# Patient Record
(demographics unavailable — no encounter records)

---

## 2024-12-16 NOTE — CONSULT LETTER
[Dear  ___] : Dear  [unfilled], [Consult Letter:] : I had the pleasure of evaluating your patient, [unfilled]. [Please see my note below.] : Please see my note below. [Consult Closing:] : Thank you very much for allowing me to participate in the care of this patient.  If you have any questions, please do not hesitate to contact me. [Sincerely,] : Sincerely, [FreeTextEntry2] : Alexis Montemayor MD   [FreeTextEntry3] : Funmi Woodruff MD  Pediatric Otolaryngology/ Head & Neck Surgery [facility]   [address] [place] [address] Tel [phone] Fax [phone]

## 2024-12-16 NOTE — REASON FOR VISIT
[Subsequent Evaluation] : a subsequent evaluation for [Mother] : mother [Hearing Loss] : hearing loss [Sleep Apnea/ Snoring] : sleep apnea/ snoring [FreeTextEntry2] : ETD, microtia of right ear

## 2024-12-16 NOTE — HISTORY OF PRESENT ILLNESS
[de-identified] : 2yoM with a history of failed hearing screen on left side and passed in History of snoring and mouth breathing no gaping and right ear microtia., fatigue Recent cough and nasal congestion sometimes uses hearing aid History of 6 ear infections in the past 12 months. All ear infections were treated with an antibiotic  History of eye discharge with the ear infections  He is babbling and has around 5 words in his vocabulary. Receives speech services but inconsistently due to accommodations due to staffing issues. Denies bleeding, otorrhea. Fever last month but resolved with Tylenol  Using BAHA, in EI occasionally snoringbut with no pauses or choking reported , with nasal congestion. No longer using saline History of kidney ultrasound per family which was normal.

## 2024-12-16 NOTE — ASSESSMENT
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  I will DEFER RENAL ultrasound imaging workup to PCP given some association with microtia however, undetermined significance.   We discussed the work up of hearing loss (imaging, genetics, possible opthalmology followup).  We discussed Early Intervention candidacy and importance of front row seating in school.  We discussed hearing restoration: A soft band retained bone conduction sound processor for now, around age 6 we can consider CT imaging to evaluate for atresia repair candidacy vs. continuing a soft band retained bone conduction sound processor  vs. an osseointegrated bone conduction sound processor vs observation.   We discussed future ear reconstruction options around age 5-7: Prosthetic ear, medpore vs autogenous rib cartilage reconstruction vs observation. The family will consider. This child presents with a history of adenotonsillar hypertrophy and sleep disordered breathing.   History of otitis media. I discussed the option of ear tube placement. I have explained the risks of myringotomy and tube including but not limited to general anesthesia, bleeding, infection, persistent symptoms, retained ear tube, otorrhea, tympanic membrane perforation, and possible need for further procedures.  I have also discussed with the family:      1.  A sleep study/overnight polysomnogram evaluation, along with a continued trial of intranasal steroids. I discussed the risks of nasal steroids (ex: Fluticasone, off label non FDA approved use) and proper application of steroids laterally in the nostrils (saline sprays may also be added in epistaxis is an issue) and the importance of consistency (but that prolonged use may cause growth issues).       2.  Given the patient's corresponding history and physical examination findings, I think that it is reasonable to proceed with a tonsillectomy and adenoidectomy.I have explained the risks of tonsillectomy and adenoidectomy including but not limited to general anesthesia, bleeding, infection, failure to extubate, injury to the teeth/lips/gums/local structures, tonsil and/or adenoid regrowth, persistence of symptoms, velopharyngeal insufficiency, nasopharyngeal stenosis, swallowing dysfunction, post-operative hemorrhage, voice changes, several days prolonged hospitalization, possible need for prolonged mechanical ventilatory support, and possible need for further procedures. I have discussed with the family and offered two options to proceed.   I discussed the option of ear tube placement. I also discussed the option of expectant management (watchful waiting and and prn antibiotics I have explained the risks of myringotomy and tube including but not limited to general anesthesia, bleeding, infection, persistent symptoms, retained ear tube, otorrhea, tympanic membrane perforation, and possible need for further procedures.  The family opts for an adenotonsillectomy AND R EAR TUBE if snoring concerns and struggling at fu and HL on audio. The child will get postoperative acetaminophen alternating with ibuprofen, soft food and no strenuous activity/gym for 2 weeks, and one week away from school.  The patient will get floxin otic (5 drops bid for 3 days then as needed for otorrhea/infection) and will need a repeat audio in 3 months.   consider allergy testing age 5 (bing with ant mucus at a young age)   At this point the family opts for trying to use ha more often, fu as planned.

## 2024-12-16 NOTE — PHYSICAL EXAM
[TextEntry] : PHYSICAL EXAM:  CONSTITUTIONAL: well nourished, well developed, NON-DYSMORPHIC, and in no acute distress.    EYES: pupils equal and round and no abnormalities of the conjunctivae and lids.   RESPIRATORY: respirations unlabored, no increased work of breathing with use of accessory muscles and retractions. NO STRIDOR.  CARDIAC: warm extremities, no cyanosis. ABDOMEN: nondistended. THORAX: no gross deformities, no pectus defects.  NEUROLOGIC: cranial nerves II - VII and IX - XII with no gross deficits.  MUSCULOSKELETAL: normal muscle STRENGTH, symmetry and tone of facial, head and neck musculature, no clubbing.  INTEGUMENTARY: no obvious skin rash, no skin lesions. LYMPHATIC: no cervical lymphadenopathy.  PSYCHIATRIC: age APPROPRIATE behavior.  HEAD: normocephalic, atraumatic.  RIGHT EAR: The right pinna was normal. 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  NOSE: External Nose: normal  Anterior Nasal Cavity: the right nasal cavity was normal and the left nasal cavity was normal.  ORAL CAVITY/OROPHARYNX: normal mucosa  Right TONSIL Size:4+ Left TONSIL Size: 4+  NECK: normal with no obvious cervical lesions

## 2024-12-16 NOTE — DATA REVIEWED
[FreeTextEntry1] : An audiogram was performed today to evaluate eustachian tube status and hearing status and the results were reviewed and reveal: Tymps: AD type C tympanogram, AS type CNT tympanogram Soundfield/Thresholds: mild HL

## 2025-04-16 NOTE — ASSESSMENT
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  I will DEFER RENAL ultrasound imaging workup to PCP given some association with microtia however, undetermined significance.   We discussed the work up of hearing loss (imaging, genetics, possible opthalmology followup).  We discussed Early Intervention candidacy and importance of front row seating in school.  We discussed hearing restoration: A soft band retained bone conduction sound processor for now, around age 6 we can consider CT imaging to evaluate for atresia repair candidacy vs. continuing a soft band retained bone conduction sound processor  vs. an osseointegrated bone conduction sound processor vs observation.   We discussed future ear reconstruction options around age 5-7: Prosthetic ear, medpore vs autogenous rib cartilage reconstruction vs observation. The family will consider.  At this point the family opts for This child presents with a history of adenotonsillar hypertrophy and sleep disordered breathing.  I have also discussed with the family:      1.  A sleep study/overnight polysomnogram evaluation, along with a continued trial of intranasal steroids. I discussed the risks of nasal steroids (ex: Fluticasone, off label non FDA approved use) and proper application of steroids laterally in the nostrils (saline sprays may also be added in epistaxis is an issue) and the importance of consistency (but that prolonged use may cause growth issues).       2.  Given the patient's corresponding history and physical examination findings, I think that it is reasonable to proceed with a tonsillectomy and adenoidectomy.I have explained the risks of tonsillectomy and adenoidectomy including but not limited to general anesthesia, bleeding, infection, failure to extubate, injury to the teeth/lips/gums/local structures, tonsil and/or adenoid regrowth, persistence of symptoms, velopharyngeal insufficiency, nasopharyngeal stenosis, swallowing dysfunction, post-operative hemorrhage, voice changes, and possible need for further procedures. I have discussed with the family and offered two options to proceed.  The family opts for fu after PSG.

## 2025-04-16 NOTE — HISTORY OF PRESENT ILLNESS
[de-identified] : 2yoM with a history of failed hearing screen on left side and passed in History of snoring and mouth breathing no gaping and right ear microtia. History of 1 ear infection - January 2025 tx with Augmentin. No ear pain or drainage. Wears BAHA left ear 2-3 hours a day. Saying about 5 words - receives speech thru EI but still inconsistently. Continues to snore - used saline spray x 2 weeks then stopped. Mouth breathing, occasional pausing noticed, but no gasping or witnessed apnea. No recent fevers, throat infections or hospitalizations.

## 2025-04-16 NOTE — DATA REVIEWED
[FreeTextEntry1] : An audiogram was performed today to evaluate eustachian tube status and hearing status and the results were reviewed and reveal: Tymps: AD type A tympanogram, AS type CNT tympanogram Soundfield/Thresholds:FF20 Ad hl as

## 2025-04-16 NOTE — CONSULT LETTER
[Dear  ___] : Dear  [unfilled], [Consult Letter:] : I had the pleasure of evaluating your patient, [unfilled]. [Please see my note below.] : Please see my note below. [Consult Closing:] : Thank you very much for allowing me to participate in the care of this patient.  If you have any questions, please do not hesitate to contact me. [Sincerely,] : Sincerely, [FreeTextEntry2] : Alexis Montemayor MD   [FreeTextEntry3] : Funmi Woodruff MD  Pediatric Otolaryngology/ Head & Neck Surgery [facility]   [address] [place] [address] Tel [phone] Fax [phone]

## 2025-04-16 NOTE — REASON FOR VISIT
[Subsequent Evaluation] : a subsequent evaluation for [Hearing Loss] : hearing loss [Sleep Apnea/ Snoring] : sleep apnea/ snoring [Mother] : mother [FreeTextEntry2] : ETD, microtia of right ear

## 2025-04-16 NOTE — PHYSICAL EXAM
[Exposed Vessel] : left anterior vessel not exposed [3+] : 3+ [Clear to Auscultation] : lungs were clear to auscultation bilaterally [Wheezing] : no wheezing [Increased Work of Breathing] : no increased work of breathing with use of accessory muscles and retractions [Normal Gait and Station] : normal gait and station [Normal muscle strength, symmetry and tone of facial, head and neck musculature] : normal muscle strength, symmetry and tone of facial, head and neck musculature [Normal] : no cervical lymphadenopathy [FreeTextEntry7] : microtia class 1